# Patient Record
Sex: MALE | Race: WHITE | NOT HISPANIC OR LATINO | URBAN - METROPOLITAN AREA
[De-identification: names, ages, dates, MRNs, and addresses within clinical notes are randomized per-mention and may not be internally consistent; named-entity substitution may affect disease eponyms.]

---

## 2020-10-15 ENCOUNTER — OFFICE VISIT (OUTPATIENT)
Dept: URBAN - METROPOLITAN AREA CLINIC 121 | Facility: CLINIC | Age: 70
End: 2020-10-15

## 2021-05-14 ENCOUNTER — OFFICE VISIT (OUTPATIENT)
Dept: URBAN - METROPOLITAN AREA CLINIC 63 | Facility: CLINIC | Age: 71
End: 2021-05-14

## 2022-07-07 ENCOUNTER — OFFICE VISIT (OUTPATIENT)
Dept: URBAN - METROPOLITAN AREA CLINIC 60 | Facility: CLINIC | Age: 72
End: 2022-07-07

## 2022-07-09 ENCOUNTER — TELEPHONE ENCOUNTER (OUTPATIENT)
Dept: URBAN - METROPOLITAN AREA CLINIC 121 | Facility: CLINIC | Age: 72
End: 2022-07-09

## 2022-07-09 RX ORDER — MELOXICAM 15 MG/1
TABLET ORAL
Refills: 0 | OUTPATIENT
Start: 2021-05-13 | End: 2021-05-14

## 2022-07-09 RX ORDER — CYCLOBENZAPRINE HYDROCHLORIDE 10 MG/1
TABLET, FILM COATED ORAL
Refills: 0 | OUTPATIENT
Start: 2021-05-14 | End: 2021-05-14

## 2022-07-09 RX ORDER — MELOXICAM 15 MG/1
TABLET ORAL
Refills: 0 | OUTPATIENT
Start: 2021-05-14 | End: 2021-05-14

## 2022-07-09 RX ORDER — CYCLOBENZAPRINE HYDROCHLORIDE 10 MG/1
TABLET, FILM COATED ORAL
Refills: 0 | OUTPATIENT
Start: 2021-05-13 | End: 2021-05-14

## 2022-07-10 ENCOUNTER — TELEPHONE ENCOUNTER (OUTPATIENT)
Dept: URBAN - METROPOLITAN AREA CLINIC 121 | Facility: CLINIC | Age: 72
End: 2022-07-10

## 2022-07-14 ENCOUNTER — OFFICE VISIT (OUTPATIENT)
Dept: URBAN - METROPOLITAN AREA CLINIC 60 | Facility: CLINIC | Age: 72
End: 2022-07-14
Payer: MEDICARE

## 2022-07-14 ENCOUNTER — WEB ENCOUNTER (OUTPATIENT)
Dept: URBAN - METROPOLITAN AREA CLINIC 60 | Facility: CLINIC | Age: 72
End: 2022-07-14

## 2022-07-14 VITALS
BODY MASS INDEX: 25.77 KG/M2 | TEMPERATURE: 97.6 F | HEIGHT: 70 IN | OXYGEN SATURATION: 100 % | SYSTOLIC BLOOD PRESSURE: 136 MMHG | DIASTOLIC BLOOD PRESSURE: 82 MMHG | WEIGHT: 180 LBS | HEART RATE: 66 BPM

## 2022-07-14 DIAGNOSIS — R74.8 ABNORMAL AST AND ALT: ICD-10-CM

## 2022-07-14 DIAGNOSIS — B18.2 CHRONIC HEPATITIS C WITHOUT HEPATIC COMA: ICD-10-CM

## 2022-07-14 PROCEDURE — 99213 OFFICE O/P EST LOW 20 MIN: CPT | Performed by: NURSE PRACTITIONER

## 2022-07-14 RX ORDER — PREDNISOLONE ACETATE 10 MG/ML
1 DROP INTO AFFECTED EYE SUSPENSION/ DROPS OPHTHALMIC TWICE A DAY
Status: ACTIVE | COMMUNITY

## 2022-07-14 RX ORDER — PIOGLITAZONE 15 MG/1
1 TABLET TABLET ORAL ONCE A DAY
Status: ACTIVE | COMMUNITY

## 2022-07-14 RX ORDER — GABAPENTIN 300 MG/1
1 CAPSULE CAPSULE ORAL ONCE A DAY
Status: ACTIVE | COMMUNITY

## 2022-07-14 RX ORDER — BACLOFEN 10 MG/1
1 TABLET AS NEEDED TABLET ORAL TWICE A DAY
Status: ACTIVE | COMMUNITY

## 2022-07-14 RX ORDER — OXYCODONE HYDROCHLORIDE AND ACETAMINOPHEN 5; 325 MG/1; MG/1
1 TABLET AS NEEDED TABLET ORAL
Status: ACTIVE | COMMUNITY

## 2022-07-14 RX ORDER — PIROXICAM 10 MG/1
1 CAPSULE WITH FOOD CAPSULE ORAL ONCE A DAY
Status: ACTIVE | COMMUNITY

## 2022-07-14 RX ORDER — MELOXICAM 15 MG/1
1 TABLET TABLET ORAL ONCE A DAY
Status: ACTIVE | COMMUNITY

## 2022-07-14 NOTE — HPI-PREVIOUS PROCEDURES
Last complete colonoscopy 03 December 2018 **************** Colonoscopy/25 February 2005.  1.  Multiple colon polyps (three advanced sessile tubular adenomatous and hyperplastic polyps (10-12 mm) were removed from the sigmoid colon.  Three hyperplastic polyps (6-9 mm) were removed from the rectum).  2.  Diverticulosis.  3.  Internal hemorrhoids.

## 2022-07-14 NOTE — HPI-PREVIOUS IMAGING
CT abdomen and pelvis with contrast/17 May 2021.  1.  No acute abdominal pelvic abnormality.  2.  Status postcholecystectomy.  3.  Minimal colonic diverticulosis without CT evidence for diverticulitis.

## 2022-07-14 NOTE — PHYSICAL EXAM CHEST:
no deformities, no chest wall non-tenderness, breathing is unlabored without accessory muscle use, abnormal breath sounds - rhonchi and adventitious bilaterally

## 2022-07-14 NOTE — HPI-PREVIOUS LABS
Lab work dated 18 May 2022 demonstrates the following abnormalities: Glucose 119, , , total bilirubin 1.06.  All remaining lab values of CMP, CRP, hemoglobin A1c and lipid panel are within normal limits. *************** Lab work dated 18 May 2021 demonstrates the following abnormalities: Glucose 127, AST 71, , direct bilirubin 0.33, MCH 34.0, alpha-fetoprotein 6.9, , HCV genotype Ia.  All remaining lab values of CBC, CMP and hepatic function panel are within normal limits. *************** Lab work dated 20 April 2021 demonstrates the following abnormalities: Cholesterol 229, glucose 129, , , hemoglobin 17.3, MCH 33.9, basophils 3.0%, hemoglobin A1c 5.8%.  All remaining lab values of CBC, CMP, TSH, vitamin D panel, lipid panel and urinalysis are negative or within normal limits.

## 2022-07-14 NOTE — HPI-TODAY'S VISIT:
Thank you very much for kindly referring Jose Antonio Teran (Russ), a very pleasant 72-year-old male, back to our service due to chronic HCV and elevated LFTs.  Past medical history significant for chronic HCV (Ia), HTN, HLD, DM2, COPD, osteopenia, osteoarthritis, nicotine dependence, EtOH abuse, abdominal aortic atherosclerosis, allergic rhinitis and colon polyps.  Past surgical history significant for cholecystectomy, back, elbow, hand and tonsillectomy.  His last complete colonoscopy was 03 December 2018 and he remembers a 5-year recall. Munir presents today complaining of right flank and right upper quadrant discomfort that became fairly severe 2 months ago but has since subsided.  Of note, he states that the pain is alleviated with position change and is unrelated to prandial events or bowel movements.  He is otherwise asymptomatic from a GI perspective and admits to EtOH cessation 3 months ago.  He denies pyrosis, dysphagia, dyspepsia, change in bowel habits, rectal bleeding, melena or unintentional weight loss.

## 2022-07-24 LAB
A/G RATIO: 1.4
AFP, SERUM, TUMOR MARKER: 12
ALBUMIN: 4
ALKALINE PHOSPHATASE: 69
ALT (SGPT): 125
AST (SGOT): 102
BASO (ABSOLUTE): 0.1
BASOS: 2
BILIRUBIN, TOTAL: 0.3
BUN/CREATININE RATIO: 11
BUN: 11
CALCIUM: 9.4
CARBON DIOXIDE, TOTAL: 25
CHLORIDE: 106
CREATININE: 0.99
EGFR: 81
EOS (ABSOLUTE): 0
EOS: 1
GLOBULIN, TOTAL: 2.9
GLUCOSE: 111
HCV AB: >11
HCV LOG10: 5.38
HEMATOCRIT: 44
HEMATOLOGY COMMENTS:: (no result)
HEMOGLOBIN: 15.9
HEPATITIS C GENOTYPE: (no result)
HEPATITIS C QUANTITATION: (no result)
IMMATURE CELLS: (no result)
IMMATURE GRANS (ABS): 0
IMMATURE GRANULOCYTES: 1
INR: 1.1
INTERPRETATION:: (no result)
LYMPHS (ABSOLUTE): 1.3
LYMPHS: 32
Lab: (no result)
MCH: 34.5
MCHC: 36.1
MCV: 95
MONOCYTES(ABSOLUTE): 0.4
MONOCYTES: 10
NEUTROPHILS (ABSOLUTE): 2.3
NEUTROPHILS: 54
NRBC: (no result)
PLATELETS: 156
POTASSIUM: 4.9
PROTEIN, TOTAL: 6.9
PROTHROMBIN TIME: 11.4
RBC: 4.61
RDW: 11.9
SODIUM: 142
TEST INFORMATION:: (no result)
WBC: 4.3

## 2022-08-18 ENCOUNTER — WEB ENCOUNTER (OUTPATIENT)
Dept: URBAN - METROPOLITAN AREA CLINIC 60 | Facility: CLINIC | Age: 72
End: 2022-08-18

## 2022-08-18 ENCOUNTER — LAB OUTSIDE AN ENCOUNTER (OUTPATIENT)
Dept: URBAN - METROPOLITAN AREA CLINIC 60 | Facility: CLINIC | Age: 72
End: 2022-08-18

## 2022-08-18 ENCOUNTER — OFFICE VISIT (OUTPATIENT)
Dept: URBAN - METROPOLITAN AREA CLINIC 60 | Facility: CLINIC | Age: 72
End: 2022-08-18
Payer: MEDICARE

## 2022-08-18 VITALS
HEIGHT: 70 IN | WEIGHT: 178 LBS | OXYGEN SATURATION: 95 % | RESPIRATION RATE: 20 BRPM | SYSTOLIC BLOOD PRESSURE: 128 MMHG | DIASTOLIC BLOOD PRESSURE: 80 MMHG | BODY MASS INDEX: 25.48 KG/M2 | HEART RATE: 64 BPM | TEMPERATURE: 97.7 F

## 2022-08-18 DIAGNOSIS — B18.2 CHRONIC HEPATITIS C WITHOUT HEPATIC COMA: ICD-10-CM

## 2022-08-18 PROBLEM — 128302006: Status: ACTIVE | Noted: 2022-07-14

## 2022-08-18 PROCEDURE — 99213 OFFICE O/P EST LOW 20 MIN: CPT | Performed by: NURSE PRACTITIONER

## 2022-08-18 RX ORDER — GLECAPREVIR AND PIBRENTASVIR 40; 100 MG/1; MG/1
3 TABLETS TABLET, FILM COATED ORAL ONCE A DAY
Qty: 84 TABLET | Refills: 1 | OUTPATIENT

## 2022-08-18 RX ORDER — GABAPENTIN 300 MG/1
1 CAPSULE CAPSULE ORAL ONCE A DAY
Status: ACTIVE | COMMUNITY

## 2022-08-18 RX ORDER — OXYCODONE HYDROCHLORIDE AND ACETAMINOPHEN 5; 325 MG/1; MG/1
1 TABLET AS NEEDED TABLET ORAL
Status: ACTIVE | COMMUNITY

## 2022-08-18 NOTE — PHYSICAL EXAM CHEST:
no deformities, no chest wall non-tenderness, breathing is unlabored without accessory muscle use, adventitious breath sounds bilaterally.

## 2022-08-18 NOTE — HPI-PREVIOUS LABS
Lab work dated 14 July 2022 demonstrates the following abnormalities: Alpha-fetoprotein 12.0, glucose 111, , , MCH 34.5, MCHC 36.1, HCV antibody >11.0, HCV quantitative load 239,000/5.378 log, HCV genotype Ia.  All remaining lab values of CBC, CMP and PT/INR are within normal limits. ********** Lab work dated 18 May 2022 demonstrates the following abnormalities: Glucose 119, , , total bilirubin 1.06.  All remaining lab values of CMP, CRP, hemoglobin A1c and lipid panel are within normal limits. *************** Lab work dated 18 May 2021 demonstrates the following abnormalities: Glucose 127, AST 71, , direct bilirubin 0.33, MCH 34.0, alpha-fetoprotein 6.9, , HCV genotype Ia.  All remaining lab values of CBC, CMP and hepatic function panel are within normal limits. *************** Lab work dated 20 April 2021 demonstrates the following abnormalities: Cholesterol 229, glucose 129, , , hemoglobin 17.3, MCH 33.9, basophils 3.0%, hemoglobin A1c 5.8%.  All remaining lab values of CBC, CMP, TSH, vitamin D panel, lipid panel and urinalysis are negative or within normal limits. .

## 2022-08-18 NOTE — HPI-PREVIOUS PROCEDURES
Last complete colonoscopy 03 December 2018 **************** Colonoscopy/25 February 2005.  1.  Multiple colon polyps (three advanced sessile tubular adenomatous and hyperplastic polyps (10-12 mm) were removed from the sigmoid colon.  Three hyperplastic polyps (6-9 mm) were removed from the rectum).  2.  Diverticulosis.  3.  Internal hemorrhoids. .

## 2022-08-18 NOTE — HPI-TODAY'S VISIT:
Jose Antonio Lopez (Russ) is a very pleasant 72-year-old male seen in follow-up of lab work secondary to chronic hepatitis C (see results below).  Past medical history significant for chronic HCV (Ia), HTN, HLD, DM2, COPD, osteopenia, osteoarthritis, nicotine dependence, EtOH abuse, abdominal aortic atherosclerosis, allergic rhinitis and colon polyps.  Past surgical history significant for cholecystectomy, back, elbow, hand and tonsillectomy.  His last complete colonoscopy was 03 December 2018 and he remembers a 5-year recall.  He admits to continued abstinence of EtOH but still smokes 2 packs of cigarettes daily.  He is otherwise asymptomatic from a GI perspective and is feeling well.  Unfortunately, abdominal ultrasound ordered on last office visit was not completed because he was under the assumption liver elastography test was the same thing.

## 2022-09-08 ENCOUNTER — TELEPHONE ENCOUNTER (OUTPATIENT)
Dept: URBAN - METROPOLITAN AREA CLINIC 63 | Facility: CLINIC | Age: 72
End: 2022-09-08

## 2023-01-30 ENCOUNTER — TELEPHONE ENCOUNTER (OUTPATIENT)
Dept: URBAN - METROPOLITAN AREA CLINIC 63 | Facility: CLINIC | Age: 73
End: 2023-01-30

## 2024-05-10 ENCOUNTER — P2P PATIENT RECORD (OUTPATIENT)
Age: 74
End: 2024-05-10

## 2024-05-13 ENCOUNTER — TELEPHONE ENCOUNTER (OUTPATIENT)
Dept: URBAN - METROPOLITAN AREA CLINIC 63 | Facility: CLINIC | Age: 74
End: 2024-05-13

## 2024-05-27 PROBLEM — 89765005: Status: ACTIVE | Noted: 2024-05-27

## 2024-05-27 PROBLEM — 43783005: Status: ACTIVE | Noted: 2024-05-27

## 2024-05-27 PROBLEM — 428283002: Status: ACTIVE | Noted: 2024-05-27

## 2024-05-28 ENCOUNTER — LAB OUTSIDE AN ENCOUNTER (OUTPATIENT)
Dept: URBAN - METROPOLITAN AREA CLINIC 60 | Facility: CLINIC | Age: 74
End: 2024-05-28

## 2024-05-28 ENCOUNTER — TELEPHONE ENCOUNTER (OUTPATIENT)
Dept: URBAN - METROPOLITAN AREA CLINIC 63 | Facility: CLINIC | Age: 74
End: 2024-05-28

## 2024-05-28 ENCOUNTER — DASHBOARD ENCOUNTERS (OUTPATIENT)
Age: 74
End: 2024-05-28

## 2024-05-28 ENCOUNTER — OFFICE VISIT (OUTPATIENT)
Dept: URBAN - METROPOLITAN AREA CLINIC 60 | Facility: CLINIC | Age: 74
End: 2024-05-28
Payer: MEDICARE

## 2024-05-28 VITALS
WEIGHT: 174 LBS | OXYGEN SATURATION: 100 % | RESPIRATION RATE: 12 BRPM | HEART RATE: 59 BPM | DIASTOLIC BLOOD PRESSURE: 70 MMHG | HEIGHT: 70 IN | BODY MASS INDEX: 24.91 KG/M2 | TEMPERATURE: 98.7 F | SYSTOLIC BLOOD PRESSURE: 136 MMHG

## 2024-05-28 DIAGNOSIS — B18.2 CHRONIC HEPATITIS C WITHOUT HEPATIC COMA: ICD-10-CM

## 2024-05-28 DIAGNOSIS — Z86.010 PERSONAL HISTORY OF COLONIC POLYPS: ICD-10-CM

## 2024-05-28 DIAGNOSIS — F17.219 CIGARETTE NICOTINE DEPENDENCE WITH NICOTINE-INDUCED DISORDER: ICD-10-CM

## 2024-05-28 DIAGNOSIS — Z78.9 MODERATE ALCOHOL CONSUMPTION: ICD-10-CM

## 2024-05-28 PROBLEM — 428882003: Status: ACTIVE | Noted: 2024-05-28

## 2024-05-28 PROCEDURE — 99215 OFFICE O/P EST HI 40 MIN: CPT | Performed by: INTERNAL MEDICINE

## 2024-05-28 RX ORDER — POLYETHYLENE GLYCOL 3350, SODIUM CHLORIDE, SODIUM BICARBONATE, POTASSIUM CHLORIDE 420; 11.2; 5.72; 1.48 G/4L; G/4L; G/4L; G/4L
AS DIRECTED POWDER, FOR SOLUTION ORAL ONCE
Qty: 4000 | Refills: 0 | OUTPATIENT
Start: 2024-05-28 | End: 2024-05-29

## 2024-05-28 RX ORDER — PIOGLITAZONE 30 MG/1
TABLET ORAL
Qty: 30 TABLET | Status: ACTIVE | COMMUNITY

## 2024-05-28 RX ORDER — ONDANSETRON HYDROCHLORIDE 4 MG/1
1 TABLET TABLET, FILM COATED ORAL
Qty: 2 | Refills: 0 | OUTPATIENT
Start: 2024-05-28

## 2024-05-28 RX ORDER — BEMPEDOIC ACID AND EZETIMIBE 180; 10 MG/1; MG/1
TABLET, FILM COATED ORAL
Qty: 30 TABLET | Status: ACTIVE | COMMUNITY

## 2024-06-03 ENCOUNTER — LAB OUTSIDE AN ENCOUNTER (OUTPATIENT)
Dept: URBAN - METROPOLITAN AREA CLINIC 63 | Facility: CLINIC | Age: 74
End: 2024-06-03

## 2024-06-04 ENCOUNTER — LAB OUTSIDE AN ENCOUNTER (OUTPATIENT)
Dept: URBAN - METROPOLITAN AREA CLINIC 60 | Facility: CLINIC | Age: 74
End: 2024-06-04

## 2024-06-10 ENCOUNTER — TELEPHONE ENCOUNTER (OUTPATIENT)
Dept: URBAN - METROPOLITAN AREA CLINIC 60 | Facility: CLINIC | Age: 74
End: 2024-06-10

## 2024-07-26 ENCOUNTER — OFFICE VISIT (OUTPATIENT)
Dept: URBAN - METROPOLITAN AREA SURGERY CENTER 4 | Facility: SURGERY CENTER | Age: 74
End: 2024-07-26

## 2024-08-07 ENCOUNTER — CLAIMS CREATED FROM THE CLAIM WINDOW (OUTPATIENT)
Dept: URBAN - METROPOLITAN AREA SURGERY CENTER 4 | Facility: SURGERY CENTER | Age: 74
End: 2024-08-07
Payer: MEDICARE

## 2024-08-07 ENCOUNTER — CLAIMS CREATED FROM THE CLAIM WINDOW (OUTPATIENT)
Dept: URBAN - METROPOLITAN AREA CLINIC 4 | Facility: CLINIC | Age: 74
End: 2024-08-07
Payer: MEDICARE

## 2024-08-07 DIAGNOSIS — K63.5 POLYP OF TRANSVERSE COLON, UNSPECIFIED TYPE: ICD-10-CM

## 2024-08-07 DIAGNOSIS — Z12.11 COLON CANCER SCREENING: ICD-10-CM

## 2024-08-07 DIAGNOSIS — K57.30 DIVERTICULOSIS OF LARGE INTESTINE WITHOUT PERFORATION OR ABSCESS WITHOUT BLEEDING: ICD-10-CM

## 2024-08-07 DIAGNOSIS — Z86.010 PERSONAL HISTORY OF COLONIC POLYPS: ICD-10-CM

## 2024-08-07 DIAGNOSIS — K63.5 POLYP OF COLON: ICD-10-CM

## 2024-08-07 DIAGNOSIS — K64.1 SECOND DEGREE HEMORRHOIDS: ICD-10-CM

## 2024-08-07 DIAGNOSIS — K62.1 RECTAL POLYP: ICD-10-CM

## 2024-08-07 DIAGNOSIS — D12.3 BENIGN NEOPLASM OF TRANSVERSE COLON: ICD-10-CM

## 2024-08-07 DIAGNOSIS — Z86.010 ADENOMAS PERSONAL HISTORY OF COLONIC POLYPS: ICD-10-CM

## 2024-08-07 DIAGNOSIS — K63.89 OTHER SPECIFIED DISEASES OF INTESTINE: ICD-10-CM

## 2024-08-07 DIAGNOSIS — D12.3 ADENOMATOUS POLYP OF TRANSVERSE COLON: ICD-10-CM

## 2024-08-07 DIAGNOSIS — D12.7 ADENOMA OF RECTOSIGMOID JUNCTION OF LARGE INTESTINE: ICD-10-CM

## 2024-08-07 PROCEDURE — 00811 ANES LWR INTST NDSC NOS: CPT | Performed by: NURSE ANESTHETIST, CERTIFIED REGISTERED

## 2024-08-07 PROCEDURE — 88300 SURGICAL PATH GROSS: CPT | Performed by: PATHOLOGY

## 2024-08-07 PROCEDURE — 45380 COLONOSCOPY AND BIOPSY: CPT | Performed by: CLINIC/CENTER

## 2024-08-07 PROCEDURE — 45380 COLONOSCOPY AND BIOPSY: CPT | Performed by: INTERNAL MEDICINE

## 2024-08-07 PROCEDURE — 45385 COLONOSCOPY W/LESION REMOVAL: CPT | Performed by: INTERNAL MEDICINE

## 2024-08-07 PROCEDURE — 88305 TISSUE EXAM BY PATHOLOGIST: CPT | Performed by: PATHOLOGY

## 2024-08-07 PROCEDURE — 45385 COLONOSCOPY W/LESION REMOVAL: CPT | Performed by: CLINIC/CENTER

## 2024-08-07 RX ORDER — BEMPEDOIC ACID AND EZETIMIBE 180; 10 MG/1; MG/1
TABLET, FILM COATED ORAL
Qty: 30 TABLET | Status: ACTIVE | COMMUNITY

## 2024-08-07 RX ORDER — PIOGLITAZONE 30 MG/1
TABLET ORAL
Qty: 30 TABLET | Status: ACTIVE | COMMUNITY

## 2024-08-07 RX ORDER — ONDANSETRON HYDROCHLORIDE 4 MG/1
1 TABLET TABLET, FILM COATED ORAL
Qty: 2 | Refills: 0 | Status: ACTIVE | COMMUNITY
Start: 2024-05-28

## 2024-08-20 ENCOUNTER — OFFICE VISIT (OUTPATIENT)
Dept: URBAN - METROPOLITAN AREA CLINIC 60 | Facility: CLINIC | Age: 74
End: 2024-08-20
Payer: MEDICARE

## 2024-08-20 ENCOUNTER — LAB OUTSIDE AN ENCOUNTER (OUTPATIENT)
Dept: URBAN - METROPOLITAN AREA CLINIC 60 | Facility: CLINIC | Age: 74
End: 2024-08-20

## 2024-08-20 VITALS
SYSTOLIC BLOOD PRESSURE: 134 MMHG | WEIGHT: 169.8 LBS | HEART RATE: 56 BPM | BODY MASS INDEX: 24.31 KG/M2 | TEMPERATURE: 98 F | HEIGHT: 70 IN | OXYGEN SATURATION: 97 % | DIASTOLIC BLOOD PRESSURE: 68 MMHG

## 2024-08-20 DIAGNOSIS — B18.2 CHRONIC HEPATITIS C WITHOUT HEPATIC COMA: ICD-10-CM

## 2024-08-20 DIAGNOSIS — K74.00 LIVER FIBROSIS: ICD-10-CM

## 2024-08-20 DIAGNOSIS — Z86.010 HISTORY OF COLON POLYPS: ICD-10-CM

## 2024-08-20 PROBLEM — 428283002: Status: ACTIVE | Noted: 2024-08-20

## 2024-08-20 PROBLEM — 62484002: Status: ACTIVE | Noted: 2024-08-20

## 2024-08-20 PROCEDURE — 99214 OFFICE O/P EST MOD 30 MIN: CPT

## 2024-08-20 RX ORDER — VELPATASVIR AND SOFOSBUVIR 100; 400 MG/1; MG/1
1 TABLET TABLET, FILM COATED ORAL ONCE A DAY
Qty: 84 TABLET | Refills: 0 | OUTPATIENT
Start: 2024-08-20 | End: 2024-11-11

## 2024-08-20 RX ORDER — PIOGLITAZONE 30 MG/1
TABLET ORAL
Qty: 30 TABLET | Status: ACTIVE | COMMUNITY

## 2024-08-20 RX ORDER — BEMPEDOIC ACID AND EZETIMIBE 180; 10 MG/1; MG/1
TABLET, FILM COATED ORAL
Qty: 30 TABLET | Status: ACTIVE | COMMUNITY

## 2024-08-20 NOTE — HPI-PREVIOUS IMAGING
CT abdomen and pelvis with contrast/17 May 2021.  1.  No acute abdominal pelvic abnormality.  2.  Status postcholecystectomy.  3.  Minimal colonic diverticulosis without CT evidence for diverticulitis. .

## 2024-08-20 NOTE — HPI-TODAY'S VISIT:
Patient returns for f/u after colonoscopy and for Hep C. He is doing well post procedure and without GI complaints. He states they sent over his Mavryet and tried for PAP but patient can't afford it. He would like to try another medication. He completed his labs which are below as well as his abdominal US. Recommends updated Fibroscan. Slightly elevated AFP which we will keep an eye on, his US without mass or lesion. Ferritin high at 784 but iron sat normal. We will recheck at f/u and patient agrees. He does not want to do any additional blood work at this time.  Last OV Dr. Kincaid 5/28/2024: Jose Antonio is a pleasant 74-year-old male who was last seen in the office by  in August 2022. At the time he had abnormal liver enzymes and was diagnosed with chronic hepatitis C. Medical history is significant for diabetes mellitus, COPD, osteoarthritis, nicotine dependence, and colon polyps. Unfortunately he was lost to follow-up and reports that he did not pursue hepatitis C treatment due to financial reasons. Today he is referred here for a screening colonoscopy. He reports mild rare acid reflux symptoms for which he takes over-the-counter medications. Denies any symptoms of dysphagia early satiety bloating abdominal pain or nausea. Denies any constipation or diarrhea. Reports intermittent rectal bleeding when he wipes himself. Denies any unintentional weight loss loss of appetite. Denies any symptoms of fatigue or shortness of breath or chest pain. He is fairly asymptomatic from his chronic hepatitis C. No complications reported from his hepatitis C. He had a viral load at the time of 239,000 with a genotype Ia. Abdominal ultrasound in 2022 noted a slightly heterogeneous echogenicity and a normal contour of the liver with no focal lesions. Spleen was normal. FibroScan elastography described Metabet stage F2/F3 fibrosis Last colonoscopy was done in 2018-I do not see a report. Colonoscopy in 2005 noted multiple polyps in the sigmoid colon and in the rectum ranging in size from 6 mm to 12 mm.-Pathology hyperplastic/tubular adenomas   4-5 beverages 2 times a week/ smokes 2 packs of cigarettes daily.  \

## 2024-08-20 NOTE — HPI-PREVIOUS PROCEDURES
Colonoscopy 8/7/2024 noted nonthrombosed external hemorrhoids, grade 2 internal hemorrhoids, normal ileum, three 7 mm polyps in the transverse colon, one 6 mm polyp in the rectosigmoid colon, 3 diminutive polyps in the rectum and diverticulosis with 3-year recall. Polyps mix of TA, SSA and hyperplastic.  Last complete colonoscopy 03 December 2018 **************** Colonoscopy/25 February 2005.  1.  Multiple colon polyps (three advanced sessile tubular adenomatous and hyperplastic polyps (10-12 mm) were removed from the sigmoid colon.  Three hyperplastic polyps (6-9 mm) were removed from the rectum).  2.  Diverticulosis.  3.  Internal hemorrhoids. .

## 2024-08-20 NOTE — HPI-PREVIOUS LABS
Lab work dated 14 July 2022 demonstrates the following abnormalities: Alpha-fetoprotein 12.0, glucose 111, , , MCH 34.5, MCHC 36.1, HCV antibody >11.0, HCV quantitative load 239,000/5.378 log, HCV genotype Ia.  All remaining lab values of CBC, CMP and PT/INR are within normal limits. ********** Lab work dated 18 May 2022 demonstrates the following abnormalities: Glucose 119, , , total bilirubin 1.06.  All remaining lab values of CMP, CRP, hemoglobin A1c and lipid panel are within normal limits. *************** Lab work dated 18 May 2021 demonstrates the following abnormalities: Glucose 127, AST 71, , direct bilirubin 0.33, MCH 34.0, alpha-fetoprotein 6.9, , HCV genotype Ia.  All remaining lab values of CBC, CMP and hepatic function panel are within normal limits. *************** Lab work dated 20 April 2021 demonstrates the following abnormalities: Cholesterol 229, glucose 129, , , hemoglobin 17.3, MCH 33.9, basophils 3.0%, hemoglobin A1c 5.8%.  All remaining lab values of CBC, CMP, TSH, vitamin D panel, lipid panel and urinalysis are negative or within normal limits. .   labs 8/1/2024 with normal CBC, EM negative, QuantiFERON-TB gold negative, AMA negative, ASMA negative, iron 137, ferritin 784, iron sat 34, AFP 8.6 (slightly elevated) and CMP with slightly elevated AST of 49.

## 2024-09-23 ENCOUNTER — TELEPHONE ENCOUNTER (OUTPATIENT)
Dept: URBAN - METROPOLITAN AREA CLINIC 60 | Facility: CLINIC | Age: 74
End: 2024-09-23

## 2024-10-02 ENCOUNTER — TELEPHONE ENCOUNTER (OUTPATIENT)
Dept: URBAN - METROPOLITAN AREA CLINIC 63 | Facility: CLINIC | Age: 74
End: 2024-10-02

## 2024-10-15 ENCOUNTER — OFFICE VISIT (OUTPATIENT)
Dept: URBAN - METROPOLITAN AREA CLINIC 60 | Facility: CLINIC | Age: 74
End: 2024-10-15
Payer: MEDICARE

## 2024-10-15 VITALS
DIASTOLIC BLOOD PRESSURE: 70 MMHG | OXYGEN SATURATION: 96 % | HEIGHT: 70 IN | TEMPERATURE: 98.4 F | SYSTOLIC BLOOD PRESSURE: 130 MMHG | HEART RATE: 51 BPM | BODY MASS INDEX: 25.34 KG/M2 | WEIGHT: 177 LBS

## 2024-10-15 DIAGNOSIS — Z86.0101 HISTORY OF ADENOMATOUS POLYP OF COLON: ICD-10-CM

## 2024-10-15 DIAGNOSIS — B18.2 CHRONIC HEPATITIS C WITHOUT HEPATIC COMA: ICD-10-CM

## 2024-10-15 PROCEDURE — 99214 OFFICE O/P EST MOD 30 MIN: CPT

## 2024-10-15 RX ORDER — PIOGLITAZONE 30 MG/1
TABLET ORAL
Qty: 30 TABLET | Status: ACTIVE | COMMUNITY

## 2024-10-15 RX ORDER — VELPATASVIR AND SOFOSBUVIR 100; 400 MG/1; MG/1
1 TABLET TABLET, FILM COATED ORAL ONCE A DAY
Qty: 84 TABLET | Refills: 0 | Status: ACTIVE | COMMUNITY
Start: 2024-08-20 | End: 2024-11-11

## 2024-10-15 RX ORDER — BEMPEDOIC ACID AND EZETIMIBE 180; 10 MG/1; MG/1
TABLET, FILM COATED ORAL
Qty: 30 TABLET | Status: ACTIVE | COMMUNITY

## 2024-10-15 NOTE — HPI-TODAY'S VISIT:
Patient returns for follow-up visit.  History of chronic hepatitis C.  Prescribed Epclusa but even after insurance coverage medication still going to be a few thousand dollars so we have already applied for PAP for patient and are awaiting her response.  FibroScan showed S1 F2.  Will repeat in 1 year. We discuss these results at today's visit. No GI complaints. Regular BMs.  Last OV 8/20/2024: Patient returns for f/u after colonoscopy and for Hep C. He is doing well post procedure and without GI complaints. He states they sent over his Mavryet and tried for PAP but patient can't afford it. He would like to try another medication. He completed his labs which are below as well as his abdominal US. Recommends updated Fibroscan. Slightly elevated AFP which we will keep an eye on, his US without mass or lesion. Ferritin high at 784 but iron sat normal. We will recheck at f/u and patient agrees. He does not want to do any additional blood work at this time.  Last OV Dr. Kincaid 5/28/2024: Jose Antonio is a pleasant 74-year-old male who was last seen in the office by  in August 2022. At the time he had abnormal liver enzymes and was diagnosed with chronic hepatitis C. Medical history is significant for diabetes mellitus, COPD, osteoarthritis, nicotine dependence, and colon polyps. Unfortunately he was lost to follow-up and reports that he did not pursue hepatitis C treatment due to financial reasons. Today he is referred here for a screening colonoscopy. He reports mild rare acid reflux symptoms for which he takes over-the-counter medications. Denies any symptoms of dysphagia early satiety bloating abdominal pain or nausea. Denies any constipation or diarrhea. Reports intermittent rectal bleeding when he wipes himself. Denies any unintentional weight loss loss of appetite. Denies any symptoms of fatigue or shortness of breath or chest pain. He is fairly asymptomatic from his chronic hepatitis C. No complications reported from his hepatitis C. He had a viral load at the time of 239,000 with a genotype Ia. Abdominal ultrasound in 2022 noted a slightly heterogeneous echogenicity and a normal contour of the liver with no focal lesions. Spleen was normal. FibroScan elastography described Metabet stage F2/F3 fibrosis Last colonoscopy was done in 2018-I do not see a report. Colonoscopy in 2005 noted multiple polyps in the sigmoid colon and in the rectum ranging in size from 6 mm to 12 mm.-Pathology hyperplastic/tubular adenomas   4-5 beverages 2 times a week/ smokes 2 packs of cigarettes daily.  \

## 2024-10-16 ENCOUNTER — TELEPHONE ENCOUNTER (OUTPATIENT)
Dept: URBAN - METROPOLITAN AREA CLINIC 6 | Facility: CLINIC | Age: 74
End: 2024-10-16

## 2024-11-27 ENCOUNTER — TELEPHONE ENCOUNTER (OUTPATIENT)
Dept: URBAN - METROPOLITAN AREA CLINIC 63 | Facility: CLINIC | Age: 74
End: 2024-11-27

## 2025-02-04 ENCOUNTER — LAB OUTSIDE AN ENCOUNTER (OUTPATIENT)
Dept: URBAN - METROPOLITAN AREA CLINIC 60 | Facility: CLINIC | Age: 75
End: 2025-02-04

## 2025-02-04 ENCOUNTER — OFFICE VISIT (OUTPATIENT)
Dept: URBAN - METROPOLITAN AREA CLINIC 60 | Facility: CLINIC | Age: 75
End: 2025-02-04
Payer: MEDICARE

## 2025-02-04 VITALS
BODY MASS INDEX: 24.91 KG/M2 | DIASTOLIC BLOOD PRESSURE: 64 MMHG | HEART RATE: 61 BPM | WEIGHT: 174 LBS | RESPIRATION RATE: 12 BRPM | TEMPERATURE: 98.7 F | HEIGHT: 70 IN | OXYGEN SATURATION: 98 % | SYSTOLIC BLOOD PRESSURE: 130 MMHG

## 2025-02-04 DIAGNOSIS — R77.2 ELEVATED AFP: ICD-10-CM

## 2025-02-04 DIAGNOSIS — R76.8 HEPATITIS C ANTIBODY POSITIVE: ICD-10-CM

## 2025-02-04 PROCEDURE — 99214 OFFICE O/P EST MOD 30 MIN: CPT

## 2025-02-04 RX ORDER — PIOGLITAZONE 30 MG/1
TABLET ORAL
Qty: 30 TABLET | Status: ACTIVE | COMMUNITY

## 2025-02-04 RX ORDER — HYDROCODONE BITARTRATE AND ACETAMINOPHEN 10; 325 MG/1; MG/1
TABLET ORAL
Qty: 90 TABLET | Status: ACTIVE | COMMUNITY

## 2025-02-04 RX ORDER — GABAPENTIN 400 MG/1
CAPSULE ORAL
Qty: 60 CAPSULE | Status: ACTIVE | COMMUNITY

## 2025-02-04 RX ORDER — BEMPEDOIC ACID AND EZETIMIBE 180; 10 MG/1; MG/1
TABLET, FILM COATED ORAL
Qty: 30 TABLET | Status: ACTIVE | COMMUNITY

## 2025-02-04 NOTE — HPI-TODAY'S VISIT:
Patient comes in to review lab results.  He was last seen 10/15/2024 by me right reminded him to complete his labs that he had not done.  HCVRNA not detected on labs completed 11/21/2024.  I called patient and discussed this in order to repeat HCV antibody with reflex which she completed 1/16/2025 which showed hepatitis C antibody positive but HCV undetectable indicating patient most likely spontaneously cleared the virus.  He is very happy with this news.  We discussed that he had not had any updated HCV testing since original diagnosis in 2022 and likely cleared the virus at some point during this interval.  We will repeat his AFP as it continues to be slightly elevated and additionally will repeat an abdominal ultrasound.  Otherwise patient denies any GI complaints.  He had his colonoscopy 8/2024 with 3-year recall.  Last OV 10/15/2024: Patient returns for follow-up visit.  History of chronic hepatitis C.  Prescribed Epclusa but even after insurance coverage medication still going to be a few thousand dollars so we have already applied for PAP for patient and are awaiting her response.  FibroScan showed S1 F2.  Will repeat in 1 year. We discuss these results at today's visit. No GI complaints. Regular BMs.  Last OV 8/20/2024: Patient returns for f/u after colonoscopy and for Hep C. He is doing well post procedure and without GI complaints. He states they sent over his Mavryet and tried for PAP but patient can't afford it. He would like to try another medication. He completed his labs which are below as well as his abdominal US. Recommends updated Fibroscan. Slightly elevated AFP which we will keep an eye on, his US without mass or lesion. Ferritin high at 784 but iron sat normal. We will recheck at f/u and patient agrees. He does not want to do any additional blood work at this time.  Last OV Dr. Kincaid 5/28/2024: Jose Antonio is a pleasant 74-year-old male who was last seen in the office by  in August 2022. At the time he had abnormal liver enzymes and was diagnosed with chronic hepatitis C. Medical history is significant for diabetes mellitus, COPD, osteoarthritis, nicotine dependence, and colon polyps. Unfortunately he was lost to follow-up and reports that he did not pursue hepatitis C treatment due to financial reasons. Today he is referred here for a screening colonoscopy. He reports mild rare acid reflux symptoms for which he takes over-the-counter medications. Denies any symptoms of dysphagia early satiety bloating abdominal pain or nausea. Denies any constipation or diarrhea. Reports intermittent rectal bleeding when he wipes himself. Denies any unintentional weight loss loss of appetite. Denies any symptoms of fatigue or shortness of breath or chest pain. He is fairly asymptomatic from his chronic hepatitis C. No complications reported from his hepatitis C. He had a viral load at the time of 239,000 with a genotype Ia. Abdominal ultrasound in 2022 noted a slightly heterogeneous echogenicity and a normal contour of the liver with no focal lesions. Spleen was normal. FibroScan elastography described Metabet stage F2/F3 fibrosis Last colonoscopy was done in 2018-I do not see a report. Colonoscopy in 2005 noted multiple polyps in the sigmoid colon and in the rectum ranging in size from 6 mm to 12 mm.-Pathology hyperplastic/tubular adenomas   4-5 beverages 2 times a week/ smokes 2 packs of cigarettes daily.  \

## 2025-02-04 NOTE — HPI-PREVIOUS LABS
Labs 1/16/2025 HCV antibody reactive reflexed with HCV undetectable consistent with resolved past infection or false positive -- Labs 11/21/2024 HCVRNA not detected CMP with slightly elevated chloride 107, otherwise unremarkable CBC unremarkable ASMA, AMA. EM negative TB neg PT/INR WNL -- Labs 8/2024 Iron 404 and saturation 34 AFP 8.6 -- Lab work dated 14 July 2022 demonstrates the following abnormalities: Alpha-fetoprotein 12.0, glucose 111, , , MCH 34.5, MCHC 36.1, HCV antibody >11.0, HCV quantitative load 239,000/5.378 log, HCV genotype Ia.  All remaining lab values of CBC, CMP and PT/INR are within normal limits. ********** Lab work dated 18 May 2022 demonstrates the following abnormalities: Glucose 119, , , total bilirubin 1.06.  All remaining lab values of CMP, CRP, hemoglobin A1c and lipid panel are within normal limits. *************** Lab work dated 18 May 2021 demonstrates the following abnormalities: Glucose 127, AST 71, , direct bilirubin 0.33, MCH 34.0, alpha-fetoprotein 6.9, , HCV genotype Ia.  All remaining lab values of CBC, CMP and hepatic function panel are within normal limits. *************** Lab work dated 20 April 2021 demonstrates the following abnormalities: Cholesterol 229, glucose 129, , , hemoglobin 17.3, MCH 33.9, basophils 3.0%, hemoglobin A1c 5.8%.  All remaining lab values of CBC, CMP, TSH, vitamin D panel, lipid panel and urinalysis are negative or within normal limits. .   labs 8/1/2024 with normal CBC, EM negative, QuantiFERON-TB gold negative, AMA negative, ASMA negative, iron 137, ferritin 784, iron sat 34, AFP 8.6 (slightly elevated) and CMP with slightly elevated AST of 49.

## 2025-02-04 NOTE — HPI-PREVIOUS IMAGING
Abdominal ultrasound 6/3/2024 noted coarsened heterogeneous hepatic echotexture indicative of diffuse hepatocellular disease such as fatty infiltration recommended FibroScan, nonvisualization of pancreas partially obscured by bowel gas and status postcholecystectomy  FibroScan 9/23/2024 Mild steatosis and moderate fibrosis  CT abdomen and pelvis with contrast/17 May 2021.  1.  No acute abdominal pelvic abnormality.  2.  Status postcholecystectomy.  3.  Minimal colonic diverticulosis without CT evidence for diverticulitis. .

## 2025-08-04 ENCOUNTER — OFFICE VISIT (OUTPATIENT)
Dept: URBAN - METROPOLITAN AREA CLINIC 60 | Facility: CLINIC | Age: 75
End: 2025-08-04

## 2025-08-04 RX ORDER — HYDROCODONE BITARTRATE AND ACETAMINOPHEN 10; 325 MG/1; MG/1
TABLET ORAL
Qty: 90 TABLET | COMMUNITY

## 2025-08-04 RX ORDER — GABAPENTIN 400 MG/1
CAPSULE ORAL
Qty: 60 CAPSULE | COMMUNITY

## 2025-08-04 RX ORDER — BEMPEDOIC ACID AND EZETIMIBE 180; 10 MG/1; MG/1
TABLET, FILM COATED ORAL
Qty: 30 TABLET | COMMUNITY

## 2025-08-04 RX ORDER — PIOGLITAZONE 30 MG/1
TABLET ORAL
Qty: 30 TABLET | COMMUNITY